# Patient Record
(demographics unavailable — no encounter records)

---

## 2024-10-20 NOTE — REASON FOR VISIT
[Patient preference] : as per patient preference [Telehealth (audio & video) - Individual/Group] : This visit was provided via telehealth using real-time 2-way audio visual technology. [Verbal consent obtained from patient/other participant(s)] : Verbal consent for telehealth/telephonic services obtained from patient/other participant(s) [Patient] : patient, [unfilled] is a ~age~ year old ~male/female~ being seen for a follow-up visit  [Duration of Psychotherapy Visit (minutes spent in synchronous communication): ____] : Duration of Psychotherapy Visit (minutes spent in synchronous communication): [unfilled] [Individual Psychotherapy for 53+ minutes] : Individual Psychotherapy for 53+ minutes  [Teletherapy Service Provided] : The services provided in this session were delivered via tele-therapy [Home] : at home, [unfilled] , at the time of the visit. [Other Location: e.g. Home (Enter Location, City,State)___] : at [unfilled] [Verbal consent obtained from patient] : the patient, [unfilled] [FreeTextEntry4] : 3:05pm [FreeTextEntry5] : 4pm [FreeTextEntry1] : chronics stress and anxiety, depression, OCD symptoms, social isolation, physical pain and mobility issues

## 2024-10-20 NOTE — PLAN
[Cognitive and/or Behavior Therapy] : Cognitive and/or Behavior Therapy  [Skills training (all types)] : Skills training (all types)  [Supportive Therapy] : Supportive Therapy [FreeTextEntry2] : Problem: anxiety symptoms including difficulty relaxing and excessive worry, obsessive thinking and compulsions; depressive symptoms including low mood, irritability, increased appetite, and problems with self-esteem. Goals: Utilization of CBT to address pt's depressive and anxiety/OCD symptoms, including self-monitoring and exposure and response prevention strategies. Pt will become more mindful of her symptoms and distress, and better able to tolerate distress without engaging in avoidance or compulsive behavior. Pt will work on reducing vulnerability factors, including by improving self-care. Pt will learn and employ strategies for relaxation. Pt's score for depression measured with the PHQ9 will be maintained in the minimal range. Pt's GAD7 score will reduce by 20%.  [de-identified] : Kellen was on time for an individual teletherapy session (video and audio). Emergency contact is her son, Sheba. Session focused on processing financial stressors and managing overstimulation in her job. Kellen was receptive to these interventions. [FreeTextEntry1] : Weekly or as-needed individual teletherapy for CBT-based treatment.

## 2024-10-20 NOTE — PHYSICAL EXAM
[WNL] : within normal limits [Anxious] : anxious [Full] : full [de-identified] : congruent with mood

## 2024-11-03 NOTE — PLAN
[Cognitive and/or Behavior Therapy] : Cognitive and/or Behavior Therapy  [Skills training (all types)] : Skills training (all types)  [Supportive Therapy] : Supportive Therapy [FreeTextEntry2] : Problem: anxiety symptoms including difficulty relaxing and excessive worry, obsessive thinking and compulsions; depressive symptoms including low mood, irritability, increased appetite, and problems with self-esteem. Goals: Utilization of CBT to address pt's depressive and anxiety/OCD symptoms, including self-monitoring and exposure and response prevention strategies. Pt will become more mindful of her symptoms and distress, and better able to tolerate distress without engaging in avoidance or compulsive behavior. Pt will work on reducing vulnerability factors, including by improving self-care. Pt will learn and employ strategies for relaxation. Pt's score for depression measured with the PHQ9 will be maintained in the minimal range. Pt's GAD7 score will reduce by 20%.  [de-identified] : Kellen was on time for an individual teletherapy session (video and audio). Emergency contact is her son, Sheba. Session focused on processing thoughts/feelings and specific fears about her age that have been difficult to cope with. Writer provided support and helped with cognitive restructuring. Kellen was receptive to these interventions. [FreeTextEntry1] : Weekly or as-needed individual teletherapy for CBT-based treatment.

## 2024-11-03 NOTE — PHYSICAL EXAM
[WNL] : within normal limits [Anxious] : anxious [Full] : full [de-identified] : congruent with mood

## 2024-11-03 NOTE — REASON FOR VISIT
[Patient preference] : as per patient preference [Telehealth (audio & video) - Individual/Group] : This visit was provided via telehealth using real-time 2-way audio visual technology. [Verbal consent obtained from patient/other participant(s)] : Verbal consent for telehealth/telephonic services obtained from patient/other participant(s) [Patient] : patient, [unfilled] is a ~age~ year old ~male/female~ being seen for a follow-up visit  [Duration of Psychotherapy Visit (minutes spent in synchronous communication): ____] : Duration of Psychotherapy Visit (minutes spent in synchronous communication): [unfilled] [Individual Psychotherapy for 53+ minutes] : Individual Psychotherapy for 53+ minutes  [Teletherapy Service Provided] : The services provided in this session were delivered via tele-therapy [Home] : at home, [unfilled] , at the time of the visit. [Other Location: e.g. Home (Enter Location, City,State)___] : at [unfilled] [Verbal consent obtained from patient] : the patient, [unfilled] [FreeTextEntry5] : 4pm [FreeTextEntry4] : 3:05pm [FreeTextEntry1] : chronics stress and anxiety, depression, OCD symptoms, social isolation, physical pain and mobility issues

## 2024-11-11 NOTE — PHYSICAL EXAM
[WNL] : within normal limits [Anxious] : anxious [Full] : full [de-identified] : congruent with mood

## 2024-11-11 NOTE — PLAN
[Cognitive and/or Behavior Therapy] : Cognitive and/or Behavior Therapy  [Skills training (all types)] : Skills training (all types)  [Supportive Therapy] : Supportive Therapy [FreeTextEntry2] : Problem: anxiety symptoms including difficulty relaxing and excessive worry, obsessive thinking and compulsions; depressive symptoms including low mood, irritability, increased appetite, and problems with self-esteem. Goals: Utilization of CBT to address pt's depressive and anxiety/OCD symptoms, including self-monitoring and exposure and response prevention strategies. Pt will become more mindful of her symptoms and distress, and better able to tolerate distress without engaging in avoidance or compulsive behavior. Pt will work on reducing vulnerability factors, including by improving self-care. Pt will learn and employ strategies for relaxation. Pt's score for depression measured with the PHQ9 will be maintained in the minimal range. Pt's GAD7 score will reduce by 20%.  [de-identified] : Kellen was on time for an individual teletherapy session (video and audio). Emergency contact is her son, Sheba. Session focused on processing plans for the holiday including work, as Kellen finds it difficult to turn down work in her current financial situation, and continued processing of stressful dynamics at home. Kellen was receptive to these interventions. [FreeTextEntry1] : Weekly or as-needed individual teletherapy for CBT-based treatment.

## 2024-11-29 NOTE — PLAN
[Cognitive and/or Behavior Therapy] : Cognitive and/or Behavior Therapy  [Skills training (all types)] : Skills training (all types)  [Supportive Therapy] : Supportive Therapy [FreeTextEntry2] : Problem: anxiety symptoms including difficulty relaxing and excessive worry, obsessive thinking and compulsions; depressive symptoms including low mood, irritability, increased appetite, and problems with self-esteem. Goals: Utilization of CBT to address pt's depressive and anxiety/OCD symptoms, including self-monitoring and exposure and response prevention strategies. Pt will become more mindful of her symptoms and distress, and better able to tolerate distress without engaging in avoidance or compulsive behavior. Pt will work on reducing vulnerability factors, including by improving self-care. Pt will learn and employ strategies for relaxation. Pt's score for depression measured with the PHQ9 will be maintained in the minimal range. Pt's GAD7 score will reduce by 20%.  [de-identified] : Kellen was on time for an individual teletherapy session (video and audio). Emergency contact is her son, Sheba. Session focused on continued support in coping with family stressors and reminders of lost connection and chronic rejection. Kellen was receptive to these interventions. [FreeTextEntry1] : Weekly or as-needed individual teletherapy for CBT-based treatment.

## 2024-11-29 NOTE — PHYSICAL EXAM
[WNL] : within normal limits [Anxious] : anxious [Full] : full [de-identified] : congruent with mood

## 2024-12-09 NOTE — PHYSICAL EXAM
[WNL] : within normal limits [Anxious] : anxious [Full] : full [de-identified] : congruent with mood

## 2024-12-09 NOTE — PLAN
[Cognitive and/or Behavior Therapy] : Cognitive and/or Behavior Therapy  [Skills training (all types)] : Skills training (all types)  [Supportive Therapy] : Supportive Therapy [FreeTextEntry2] : Problem: anxiety symptoms including difficulty relaxing and excessive worry, obsessive thinking and compulsions; depressive symptoms including low mood, irritability, increased appetite, and problems with self-esteem. Goals: Utilization of CBT to address pt's depressive and anxiety/OCD symptoms, including self-monitoring and exposure and response prevention strategies. Pt will become more mindful of her symptoms and distress, and better able to tolerate distress without engaging in avoidance or compulsive behavior. Pt will work on reducing vulnerability factors, including by improving self-care. Pt will learn and employ strategies for relaxation. Pt's score for depression measured with the PHQ9 will be maintained in the minimal range. Pt's GAD7 score will reduce by 20%.  [de-identified] : Kellen was on time for an individual teletherapy session (video and audio). Emergency contact is her son, Sheba. Session focused on continued support regarding Kellen's multiple health concerns and existential fears. Processed news that she has a high risk/chance that macular degeneration will take her eyesight in the next 10 years. Explored preventive measures and Kellen's motivation to stay on top of her care. Kellen was receptive to these interventions. [FreeTextEntry1] : Weekly or as-needed individual teletherapy for CBT-based treatment.

## 2024-12-13 NOTE — PHYSICAL EXAM
[WNL] : within normal limits [Depressed] : depressed [Anxious] : anxious [de-identified] : congruent with mood

## 2024-12-13 NOTE — REASON FOR VISIT
[Patient preference] : as per patient preference [Telehealth (audio & video) - Individual/Group] : This visit was provided via telehealth using real-time 2-way audio visual technology. [Verbal consent obtained from patient/other participant(s)] : Verbal consent for telehealth/telephonic services obtained from patient/other participant(s) [Patient] : patient, [unfilled] is a ~age~ year old ~male/female~ being seen for a follow-up visit  [Duration of Psychotherapy Visit (minutes spent in synchronous communication): ____] : Duration of Psychotherapy Visit (minutes spent in synchronous communication): [unfilled] [Individual Psychotherapy for 53+ minutes] : Individual Psychotherapy for 53+ minutes  [Teletherapy Service Provided] : The services provided in this session were delivered via tele-therapy [Home] : at home, [unfilled] , at the time of the visit. [Other Location: e.g. Home (Enter Location, City,State)___] : at [unfilled] [Verbal consent obtained from patient] : the patient, [unfilled] [FreeTextEntry4] : 4:05pm [FreeTextEntry5] : 5pm [FreeTextEntry1] : chronics stress and anxiety, depression, OCD symptoms, social isolation, physical pain and mobility issues

## 2024-12-13 NOTE — PLAN
[Cognitive and/or Behavior Therapy] : Cognitive and/or Behavior Therapy  [Skills training (all types)] : Skills training (all types)  [Supportive Therapy] : Supportive Therapy [FreeTextEntry2] : Problem: anxiety symptoms including difficulty relaxing and excessive worry, obsessive thinking and compulsions; depressive symptoms including low mood, irritability, increased appetite, and problems with self-esteem. Goals: Utilization of CBT to address pt's depressive and anxiety/OCD symptoms, including self-monitoring and exposure and response prevention strategies. Pt will become more mindful of her symptoms and distress, and better able to tolerate distress without engaging in avoidance or compulsive behavior. Pt will work on reducing vulnerability factors, including by improving self-care. Pt will learn and employ strategies for relaxation. Pt's score for depression measured with the PHQ9 will be maintained in the minimal range. Pt's GAD7 score will reduce by 20%.  [de-identified] : Kellen was on time for an individual teletherapy session (video and audio). Emergency contact is her son, Sheba. Session focused on processing Kellen's family conflicts and coping with her family's undue pressure, misunderstanding of her intentions and guilt-provoking statements regarding Kellne's reluctance to take on a caretaker role for her sister. Writer provided support and validation and encouraged acceptance and detachment (Marlen) as needed to manage stress. Kellen was receptive to these interventions. [FreeTextEntry1] : Weekly or as-needed individual teletherapy for CBT-based treatment.

## 2024-12-26 NOTE — PLAN
[Cognitive and/or Behavior Therapy] : Cognitive and/or Behavior Therapy  [Skills training (all types)] : Skills training (all types)  [Supportive Therapy] : Supportive Therapy [FreeTextEntry2] : Problem: anxiety symptoms including difficulty relaxing and excessive worry, obsessive thinking and compulsions; depressive symptoms including low mood, irritability, increased appetite, and problems with self-esteem. Goals: Utilization of CBT to address pt's depressive and anxiety/OCD symptoms, including self-monitoring and exposure and response prevention strategies. Pt will become more mindful of her symptoms and distress, and better able to tolerate distress without engaging in avoidance or compulsive behavior. Pt will work on reducing vulnerability factors, including by improving self-care. Pt will learn and employ strategies for relaxation. Pt's score for depression measured with the PHQ9 will be maintained in the minimal range. Pt's GAD7 score will reduce by 20%.  [de-identified] : Kellen was on time for an individual teletherapy session (video and audio). Emergency contact is her son, Sheba. Session focused on continued processing of family conflict over Martha's sister's housing and chronic physical and mental health problems. Writer attempted to help Kellen with managing her chronic ruminations and challenging unhelpful attempts to defend herself, as it only leads to increased frustration and stress when her family refuses to acknowledge her points, her needs or her efforts over the years. Kellen was receptive to these interventions. [FreeTextEntry1] : Weekly or as-needed individual teletherapy for CBT-based treatment.

## 2024-12-26 NOTE — PHYSICAL EXAM
[WNL] : within normal limits [Depressed] : depressed [Anxious] : anxious [de-identified] : congruent with mood

## 2024-12-26 NOTE — END OF VISIT
[Individual Psychotherapy for 53+ minutes] : Individual Psychotherapy for 53+ minutes  Current some day smoker

## 2025-01-01 NOTE — PLAN
[Cognitive and/or Behavior Therapy] : Cognitive and/or Behavior Therapy  [Skills training (all types)] : Skills training (all types)  [Supportive Therapy] : Supportive Therapy [FreeTextEntry2] : Problem: anxiety symptoms including difficulty relaxing and excessive worry, obsessive thinking and compulsions; depressive symptoms including low mood, irritability, increased appetite, and problems with self-esteem. Goals: Utilization of CBT to address pt's depressive and anxiety/OCD symptoms, including self-monitoring and exposure and response prevention strategies. Pt will become more mindful of her symptoms and distress, and better able to tolerate distress without engaging in avoidance or compulsive behavior. Pt will work on reducing vulnerability factors, including by improving self-care. Pt will learn and employ strategies for relaxation. Pt's score for depression measured with the PHQ9 will be maintained in the minimal range. Pt's GAD7 score will reduce by 20%.  [de-identified] : Kellen was on time for an individual teletherapy session (video and audio). Emergency contact is her son, Sheba. Session focused on continued support in challenging ruminations about family and prioritizing self-care. Kellen was receptive to these interventions. [FreeTextEntry1] : Weekly or as-needed individual teletherapy for CBT-based treatment.

## 2025-01-05 NOTE — PLAN
[Cognitive and/or Behavior Therapy] : Cognitive and/or Behavior Therapy  [Skills training (all types)] : Skills training (all types)  [Supportive Therapy] : Supportive Therapy [FreeTextEntry2] : Problem: anxiety symptoms including difficulty relaxing and excessive worry, obsessive thinking and compulsions; depressive symptoms including low mood, irritability, increased appetite, and problems with self-esteem. Goals: Utilization of CBT to address pt's depressive and anxiety/OCD symptoms, including self-monitoring and exposure and response prevention strategies. Pt will become more mindful of her symptoms and distress, and better able to tolerate distress without engaging in avoidance or compulsive behavior. Pt will work on reducing vulnerability factors, including by improving self-care. Pt will learn and employ strategies for relaxation. Pt's score for depression measured with the PHQ9 will be maintained in the minimal range. Pt's GAD7 score will reduce by 20%.  [de-identified] : Kellen was on time for an individual teletherapy session (video and audio). Emergency contact is her son, Sheba. Session focused on continued support to valencia rumination, as Kellen notices she continues to have internal dialogue as if arguing with her family, while in reality family is not receptive to understanding Kellen better or treating her better. Kellen was receptive to these interventions. [FreeTextEntry1] : Weekly or as-needed individual teletherapy for CBT-based treatment.

## 2025-01-13 NOTE — PLAN
[Cognitive and/or Behavior Therapy] : Cognitive and/or Behavior Therapy  [Skills training (all types)] : Skills training (all types)  [Supportive Therapy] : Supportive Therapy [FreeTextEntry2] : Problem: anxiety symptoms including difficulty relaxing and excessive worry, obsessive thinking and compulsions; depressive symptoms including low mood, irritability, increased appetite, and problems with self-esteem. Goals: Utilization of CBT to address pt's depressive and anxiety/OCD symptoms, including self-monitoring and exposure and response prevention strategies. Pt will become more mindful of her symptoms and distress, and better able to tolerate distress without engaging in avoidance or compulsive behavior. Pt will work on reducing vulnerability factors, including by improving self-care. Pt will learn and employ strategies for relaxation. Pt's score for depression measured with the PHQ9 will be maintained in the minimal range. Pt's GAD7 score will reduce by 20%.  [de-identified] : Kellen was on time for an individual teletherapy session (video and audio). Emergency contact is her son, Sheba. Session focused on highlighting aspects of Kellen's health and family relationships that cannot be changed and will require some acceptance work versus problem-solving. Validated Kellen's fears due to her health problems while highlighting the toll of chronic worry on her health and enjoyment of life. Kellen was receptive to these interventions. [FreeTextEntry1] : Weekly or as-needed individual teletherapy for CBT-based treatment.

## 2025-01-21 NOTE — PLAN
[Cognitive and/or Behavior Therapy] : Cognitive and/or Behavior Therapy  [Skills training (all types)] : Skills training (all types)  [Supportive Therapy] : Supportive Therapy [FreeTextEntry2] : Problem: anxiety symptoms including difficulty relaxing and excessive worry, obsessive thinking and compulsions; depressive symptoms including low mood, irritability, increased appetite, and problems with self-esteem. Goals: Utilization of CBT to address pt's depressive and anxiety/OCD symptoms, including self-monitoring and exposure and response prevention strategies. Pt will become more mindful of her symptoms and distress, and better able to tolerate distress without engaging in avoidance or compulsive behavior. Pt will work on reducing vulnerability factors, including by improving self-care. Pt will learn and employ strategies for relaxation. Pt's score for depression measured with the PHQ9 will be maintained in the minimal range. Pt's GAD7 score will reduce by 20%.  [de-identified] : Kellen was on time for an individual teletherapy session (video and audio). Emergency contact is her son, Sheba. Session focused on supporting Kellen related to health concerns as well as her decision to reconnect with a friend who has been a source of both connection and stress in the past. Discussed pros and cons of re-engaging with individuals who may evoke stress and trigger feelings of rejection but who are familiar to her and help alleviate loneliness/isolation. Kellen was receptive to these interventions. [FreeTextEntry1] : Weekly or as-needed individual teletherapy for CBT-based treatment.

## 2025-01-28 NOTE — PLAN
[Cognitive and/or Behavior Therapy] : Cognitive and/or Behavior Therapy  [Skills training (all types)] : Skills training (all types)  [Supportive Therapy] : Supportive Therapy [FreeTextEntry2] : Problem: anxiety symptoms including difficulty relaxing and excessive worry, obsessive thinking and compulsions; depressive symptoms including low mood, irritability, increased appetite, and problems with self-esteem. Goals: Utilization of CBT to address pt's depressive and anxiety/OCD symptoms, including self-monitoring and exposure and response prevention strategies. Pt will become more mindful of her symptoms and distress, and better able to tolerate distress without engaging in avoidance or compulsive behavior. Pt will work on reducing vulnerability factors, including by improving self-care. Pt will learn and employ strategies for relaxation. Pt's score for depression measured with the PHQ9 will be maintained in the minimal range. Pt's GAD7 score will reduce by 20%.  [de-identified] : Kellen was on time for an individual teletherapy session (video and audio). Emergency contact is her son, Sheba. Session focused on supporting Kellen regarding her relationship satisfaction and reviewing coping skills. Kellen was receptive to these interventions. [FreeTextEntry1] : Weekly or as-needed individual teletherapy for CBT-based treatment.

## 2025-01-31 NOTE — PLAN
[Cognitive and/or Behavior Therapy] : Cognitive and/or Behavior Therapy  [Skills training (all types)] : Skills training (all types)  [Supportive Therapy] : Supportive Therapy [FreeTextEntry2] : Problem: anxiety symptoms including difficulty relaxing and excessive worry, obsessive thinking and compulsions; depressive symptoms including low mood, irritability, increased appetite, and problems with self-esteem. Goals: Utilization of CBT to address pt's depressive and anxiety/OCD symptoms, including self-monitoring and exposure and response prevention strategies. Pt will become more mindful of her symptoms and distress, and better able to tolerate distress without engaging in avoidance or compulsive behavior. Pt will work on reducing vulnerability factors, including by improving self-care. Pt will learn and employ strategies for relaxation. Pt's score for depression measured with the PHQ9 will be maintained in the minimal range. Pt's GAD7 score will reduce by 20%.  [Motivational Interviewing] : Motivational Interviewing  [de-identified] : Kellen was on time for an individual teletherapy session (video and audio). Emergency contact is her son, Sheba. Session focused on processing and challenging mortality preoccupation and negative thoughts about the future. Discussed pros and cons of continuing to avoid thinking about socializing versus proactively seeking connections and new purpose in anticipation of her son moving. Processed fears of abandonment and how this plays into avoidance. Kellen was receptive to these interventions. [FreeTextEntry1] : Weekly or as-needed individual teletherapy for CBT-based treatment.

## 2025-02-24 NOTE — PLAN
[Cognitive and/or Behavior Therapy] : Cognitive and/or Behavior Therapy  [Motivational Interviewing] : Motivational Interviewing  [Skills training (all types)] : Skills training (all types)  [Supportive Therapy] : Supportive Therapy [FreeTextEntry2] : Problem: anxiety symptoms including difficulty relaxing and excessive worry, obsessive thinking and compulsions; depressive symptoms including low mood, irritability, increased appetite, and problems with self-esteem. Goals: Utilization of CBT to address pt's depressive and anxiety/OCD symptoms, including self-monitoring and exposure and response prevention strategies. Pt will become more mindful of her symptoms and distress, and better able to tolerate distress without engaging in avoidance or compulsive behavior. Pt will work on reducing vulnerability factors, including by improving self-care. Pt will learn and employ strategies for relaxation. Pt's score for depression measured with the PHQ9 will be maintained in the minimal range. Pt's GAD7 score will reduce by 20%.  [de-identified] : Kellen was on time for an individual teletherapy session (video and audio). Emergency contact is her son, Sheba. Session focused on processing Kellen's work on more realistic expectations and acceptance of her best friend, whom she recent chose to re-engage after a falling out. Discussed skills to better manage frustrations regarding her best friend's personality traits. Kellen was receptive to these interventions. [FreeTextEntry1] : Weekly or as-needed individual teletherapy for CBT-based treatment.

## 2025-03-10 NOTE — PLAN
[Cognitive and/or Behavior Therapy] : Cognitive and/or Behavior Therapy  [Motivational Interviewing] : Motivational Interviewing  [Skills training (all types)] : Skills training (all types)  [Supportive Therapy] : Supportive Therapy [FreeTextEntry2] : Problem: anxiety symptoms including difficulty relaxing and excessive worry, obsessive thinking and compulsions; depressive symptoms including low mood, irritability, increased appetite, and problems with self-esteem. Goals: Utilization of CBT to address pt's depressive and anxiety/OCD symptoms, including self-monitoring and exposure and response prevention strategies. Pt will become more mindful of her symptoms and distress, and better able to tolerate distress without engaging in avoidance or compulsive behavior. Pt will work on reducing vulnerability factors, including by improving self-care. Pt will learn and employ strategies for relaxation. Pt's score for depression measured with the PHQ9 will be maintained in the minimal range. Pt's GAD7 score will reduce by 20%.  [de-identified] : Kellen was on time for an individual teletherapy session (video and audio). Emergency contact is her son, Sheba. Session focused on processing financial concerns as Kellen prepares for her son to move out in the near future, which will mean the end of his household contributions. Discussed the benefits of Kellen's hobby of coloring and reinforced her involvement in an online group with peers who also enjoy the hobby and are encouraging of each other without judgment. Kellen was receptive to these interventions. [FreeTextEntry1] : Weekly or as-needed individual teletherapy for CBT-based treatment.

## 2025-03-16 NOTE — PLAN
[Cognitive and/or Behavior Therapy] : Cognitive and/or Behavior Therapy  [Motivational Interviewing] : Motivational Interviewing  [Skills training (all types)] : Skills training (all types)  [Supportive Therapy] : Supportive Therapy [FreeTextEntry2] : Problem: anxiety symptoms including difficulty relaxing and excessive worry, obsessive thinking and compulsions; depressive symptoms including low mood, irritability, increased appetite, and problems with self-esteem. Goals: Utilization of CBT to address pt's depressive and anxiety/OCD symptoms, including self-monitoring and exposure and response prevention strategies. Pt will become more mindful of her symptoms and distress, and better able to tolerate distress without engaging in avoidance or compulsive behavior. Pt will work on reducing vulnerability factors, including by improving self-care. Pt will learn and employ strategies for relaxation. Pt's score for depression measured with the PHQ9 will be maintained in the minimal range. Pt's GAD7 score will reduce by 20%.  [de-identified] : Kellen was on time for an individual teletherapy session (video and audio). Emergency contact is her son, Sheba. Session focused on processing anticipatory grief related to new information suggesting son may find his own home and move out soon. Challenged negative, distorted thoughts about son "betraying" her with his life choices. Kellen was receptive to these interventions. [FreeTextEntry1] : Weekly or as-needed individual teletherapy for CBT-based treatment.

## 2025-03-23 NOTE — PLAN
[Cognitive and/or Behavior Therapy] : Cognitive and/or Behavior Therapy  [Motivational Interviewing] : Motivational Interviewing  [Skills training (all types)] : Skills training (all types)  [Supportive Therapy] : Supportive Therapy [FreeTextEntry2] : Problem: anxiety symptoms including difficulty relaxing and excessive worry, obsessive thinking and compulsions; depressive symptoms including low mood, irritability, increased appetite, and problems with self-esteem. Goals: Utilization of CBT to address pt's depressive and anxiety/OCD symptoms, including self-monitoring and exposure and response prevention strategies. Pt will become more mindful of her symptoms and distress, and better able to tolerate distress without engaging in avoidance or compulsive behavior. Pt will work on reducing vulnerability factors, including by improving self-care. Pt will learn and employ strategies for relaxation. Pt's score for depression measured with the PHQ9 will be maintained in the minimal range. Pt's GAD7 score will reduce by 20%.  [de-identified] : Kellen was on time for an individual teletherapy session (video and audio). Emergency contact is her son, Sheba. Session focused on processing relational challenges with her friend. Writer used CBT interventions, and Kellen was receptive. [FreeTextEntry1] : Weekly or as-needed individual teletherapy for CBT-based treatment.

## 2025-03-30 NOTE — PLAN
[Cognitive and/or Behavior Therapy] : Cognitive and/or Behavior Therapy  [Motivational Interviewing] : Motivational Interviewing  [Skills training (all types)] : Skills training (all types)  [Supportive Therapy] : Supportive Therapy [FreeTextEntry2] : Problem: anxiety symptoms including difficulty relaxing and excessive worry, obsessive thinking and compulsions; depressive symptoms including low mood, irritability, increased appetite, and problems with self-esteem. Goals: Utilization of CBT to address pt's depressive and anxiety/OCD symptoms, including self-monitoring and exposure and response prevention strategies. Pt will become more mindful of her symptoms and distress, and better able to tolerate distress without engaging in avoidance or compulsive behavior. Pt will work on reducing vulnerability factors, including by improving self-care. Pt will learn and employ strategies for relaxation. Pt's score for depression measured with the PHQ9 will be maintained in the minimal range. Pt's GAD7 score will reduce by 20%.  [de-identified] : Kellen was on time for an individual teletherapy session (video and audio). Emergency contact is her son, Sheba. Session focused on continued emotional support and help with cognitive restructuring around her fears, intrusive and ruminative thoughts about her son moving in the near future and "being alone." Noted Kellen is processing son's plans to move similar to an impending death, and this is activating her trauma and existential fears. Kellen was receptive to these interventions.  [FreeTextEntry1] : Weekly or as-needed individual teletherapy for CBT-based treatment.

## 2025-04-23 NOTE — PLAN
[Cognitive and/or Behavior Therapy] : Cognitive and/or Behavior Therapy  [Motivational Interviewing] : Motivational Interviewing  [Skills training (all types)] : Skills training (all types)  [Supportive Therapy] : Supportive Therapy [FreeTextEntry2] : Problem: anxiety symptoms including difficulty relaxing and excessive worry, obsessive thinking and compulsions; depressive symptoms including low mood, irritability, increased appetite, and problems with self-esteem. Goals: Utilization of CBT to address pt's depressive and anxiety/OCD symptoms, including self-monitoring and exposure and response prevention strategies. Pt will become more mindful of her symptoms and distress, and better able to tolerate distress without engaging in avoidance or compulsive behavior. Pt will work on reducing vulnerability factors, including by improving self-care. Pt will learn and employ strategies for relaxation. Pt's score for depression measured with the PHQ9 will be maintained in the minimal range. Pt's GAD7 score will reduce by 20%.  [de-identified] : Kellen was on time for an individual teletherapy session (video and audio). Emergency contact is her son, Sheba. Session focused on providing support and cognitive restructuring around Kellen's thoughts about son's decision to move 3 hours away. Kellen was receptive to these interventions.  [FreeTextEntry1] : Weekly or as-needed individual teletherapy for CBT-based treatment.

## 2025-04-23 NOTE — PLAN
[Cognitive and/or Behavior Therapy] : Cognitive and/or Behavior Therapy  [Motivational Interviewing] : Motivational Interviewing  [Skills training (all types)] : Skills training (all types)  [Supportive Therapy] : Supportive Therapy [FreeTextEntry2] : Problem: anxiety symptoms including difficulty relaxing and excessive worry, obsessive thinking and compulsions; depressive symptoms including low mood, irritability, increased appetite, and problems with self-esteem. Goals: Utilization of CBT to address pt's depressive and anxiety/OCD symptoms, including self-monitoring and exposure and response prevention strategies. Pt will become more mindful of her symptoms and distress, and better able to tolerate distress without engaging in avoidance or compulsive behavior. Pt will work on reducing vulnerability factors, including by improving self-care. Pt will learn and employ strategies for relaxation. Pt's score for depression measured with the PHQ9 will be maintained in the minimal range. Pt's GAD7 score will reduce by 20%.  [de-identified] : Kellen was on time for an individual teletherapy session (video and audio). Emergency contact is her son, Sheba. Session focused on providing support and cognitive restructuring around Kellen's thoughts about son's decision to move 3 hours away. Kellen was receptive to these interventions.  [FreeTextEntry1] : Weekly or as-needed individual teletherapy for CBT-based treatment.

## 2025-04-29 NOTE — PLAN
[Cognitive and/or Behavior Therapy] : Cognitive and/or Behavior Therapy  [Motivational Interviewing] : Motivational Interviewing  [Skills training (all types)] : Skills training (all types)  [Supportive Therapy] : Supportive Therapy [FreeTextEntry2] : Problem: anxiety symptoms including difficulty relaxing and excessive worry, obsessive thinking and compulsions; depressive symptoms including low mood, irritability, increased appetite, and problems with self-esteem. Goals: Utilization of CBT to address pt's depressive and anxiety/OCD symptoms, including self-monitoring and exposure and response prevention strategies. Pt will become more mindful of her symptoms and distress, and better able to tolerate distress without engaging in avoidance or compulsive behavior. Pt will work on reducing vulnerability factors, including by improving self-care. Pt will learn and employ strategies for relaxation. Pt's score for depression measured with the PHQ9 will be maintained in the minimal range. Pt's GAD7 score will reduce by 20%.  [de-identified] : Kellen was on time for an individual teletherapy session (video and audio). Emergency contact is her son, Sheba. Session focused on continued support and cognitive restructuring related to her son's impending move several hours away. Writer noted Kellen's improved ability to talk about and visualize this change, rather than avoiding or protesting it. Kellen was receptive to these interventions.  [FreeTextEntry1] : Weekly or as-needed individual teletherapy for CBT-based treatment.

## 2025-05-12 NOTE — PLAN
[Cognitive and/or Behavior Therapy] : Cognitive and/or Behavior Therapy  [Motivational Interviewing] : Motivational Interviewing  [Skills training (all types)] : Skills training (all types)  [Supportive Therapy] : Supportive Therapy [FreeTextEntry2] : Problem: anxiety symptoms including difficulty relaxing and excessive worry, obsessive thinking and compulsions; depressive symptoms including low mood, irritability, increased appetite, and problems with self-esteem. Goals: Utilization of CBT to address pt's depressive and anxiety/OCD symptoms, including self-monitoring and exposure and response prevention strategies. Pt will become more mindful of her symptoms and distress, and better able to tolerate distress without engaging in avoidance or compulsive behavior. Pt will work on reducing vulnerability factors, including by improving self-care. Pt will learn and employ strategies for relaxation. Pt's score for depression measured with the PHQ9 will be maintained in the minimal range. Pt's GAD7 score will reduce by 20%.  [de-identified] : Kellen was on time for an individual teletherapy session (video and audio). Emergency contact is her son, Sheba. Session focused on reviewing health stressors and reinforcing general coping skills as well as emotional exposure as needed to challenge unhealthy avoidance. Kellen was receptive to these interventions.  [FreeTextEntry1] : Weekly or as-needed individual teletherapy for CBT-based treatment.

## 2025-05-20 NOTE — PLAN
[Cognitive and/or Behavior Therapy] : Cognitive and/or Behavior Therapy  [Motivational Interviewing] : Motivational Interviewing  [Skills training (all types)] : Skills training (all types)  [Supportive Therapy] : Supportive Therapy [FreeTextEntry2] : Problem: anxiety symptoms including difficulty relaxing and excessive worry, obsessive thinking and compulsions; depressive symptoms including low mood, irritability, increased appetite, and problems with self-esteem. Goals: Utilization of CBT to address pt's depressive and anxiety/OCD symptoms, including self-monitoring and exposure and response prevention strategies. Pt will become more mindful of her symptoms and distress, and better able to tolerate distress without engaging in avoidance or compulsive behavior. Pt will work on reducing vulnerability factors, including by improving self-care. Pt will learn and employ strategies for relaxation. Pt's score for depression measured with the PHQ9 will be maintained in the minimal range. Pt's GAD7 score will reduce by 20%.  [de-identified] : Kellen was on time for an individual teletherapy session (video and audio). Emergency contact is her son, Sheba. Session focused on depression management as Kellen continues to report tearfulness almost every day and difficulty managing negative thoughts that are interfering with her quality of life. Reviewed evidence for and against her own resilience capability and noted her tendency to underestimate herself as well as avoid thinking about change. Kellen was receptive to these interventions.  [FreeTextEntry1] : Weekly or as-needed individual teletherapy for CBT-based treatment.

## 2025-05-27 NOTE — PLAN
[Cognitive and/or Behavior Therapy] : Cognitive and/or Behavior Therapy  [Motivational Interviewing] : Motivational Interviewing  [Skills training (all types)] : Skills training (all types)  [Supportive Therapy] : Supportive Therapy [FreeTextEntry2] : Problem: anxiety symptoms including difficulty relaxing and excessive worry, obsessive thinking and compulsions; depressive symptoms including low mood, irritability, increased appetite, and problems with self-esteem. Goals: Utilization of CBT to address pt's depressive and anxiety/OCD symptoms, including self-monitoring and exposure and response prevention strategies. Pt will become more mindful of her symptoms and distress, and better able to tolerate distress without engaging in avoidance or compulsive behavior. Pt will work on reducing vulnerability factors, including by improving self-care. Pt will learn and employ strategies for relaxation. Pt's score for depression measured with the PHQ9 will be maintained in the minimal range. Pt's GAD7 score will reduce by 20%.  [de-identified] : Kellen was on time for an individual teletherapy session (video and audio). Emergency contact is her son, Sheba. Session focused on stressors of her dog's health issues as well as her sister's recent outreach to Kellen. Discussed why contact by sister would consistently provoke her anxiety and helped prepare Kellen for how she would respond if sister asked to move in with her. Kellen was receptive to these interventions.  [FreeTextEntry1] : Weekly or as-needed individual teletherapy for CBT-based treatment.

## 2025-06-02 NOTE — PLAN
[Cognitive and/or Behavior Therapy] : Cognitive and/or Behavior Therapy  [Motivational Interviewing] : Motivational Interviewing  [Skills training (all types)] : Skills training (all types)  [Supportive Therapy] : Supportive Therapy [FreeTextEntry2] : Problem: anxiety symptoms including difficulty relaxing and excessive worry, obsessive thinking and compulsions; depressive symptoms including low mood, irritability, increased appetite, and problems with self-esteem. Goals: Utilization of CBT to address pt's depressive and anxiety/OCD symptoms, including self-monitoring and exposure and response prevention strategies. Pt will become more mindful of her symptoms and distress, and better able to tolerate distress without engaging in avoidance or compulsive behavior. Pt will work on reducing vulnerability factors, including by improving self-care. Pt will learn and employ strategies for relaxation. Pt's score for depression measured with the PHQ9 will be maintained in the minimal range. Pt's GAD7 score will reduce by 20%.  [de-identified] : Kellen was on time for an individual teletherapy session (video and audio). Emergency contact is her son, Sheba. Session focused on exploring Kellen's dynamics with son, which cause chronic stress, and providing psychoeducation and encouragement around boundaries and avoiding reinforcement or enabling of abusive or hurtful behavior. Kellen was receptive to these interventions.  [FreeTextEntry1] : Weekly or as-needed individual teletherapy for CBT-based treatment.

## 2025-06-23 NOTE — PLAN
[Cognitive and/or Behavior Therapy] : Cognitive and/or Behavior Therapy  [Motivational Interviewing] : Motivational Interviewing  [Skills training (all types)] : Skills training (all types)  [Supportive Therapy] : Supportive Therapy [FreeTextEntry2] : Problem: anxiety symptoms including difficulty relaxing and excessive worry, obsessive thinking and compulsions; depressive symptoms including low mood, irritability, increased appetite, and problems with self-esteem. Goals: Utilization of CBT to address pt's depressive and anxiety/OCD symptoms, including self-monitoring and exposure and response prevention strategies. Pt will become more mindful of her symptoms and distress, and better able to tolerate distress without engaging in avoidance or compulsive behavior. Pt will work on reducing vulnerability factors, including by improving self-care. Pt will learn and employ strategies for relaxation. Pt's score for depression measured with the PHQ9 will be maintained in the minimal range. Pt's GAD7 score will reduce by 20%.  [de-identified] : Kellen was on time for an individual teletherapy session (video and audio). Emergency contact is her son, Sheba. Session focused on continued support for stress of life transitions (anticipated empty nest), especially due to financial and emotional dependence on son. Writer helped with insight development and reinforcement of resilience principles. Kellen was receptive to these interventions.  [FreeTextEntry1] : Weekly or as-needed individual teletherapy for CBT-based treatment.

## 2025-07-07 NOTE — PLAN
[Cognitive and/or Behavior Therapy] : Cognitive and/or Behavior Therapy  [Motivational Interviewing] : Motivational Interviewing  [Skills training (all types)] : Skills training (all types)  [Supportive Therapy] : Supportive Therapy [FreeTextEntry2] : Problem: anxiety symptoms including difficulty relaxing and excessive worry, obsessive thinking and compulsions; depressive symptoms including low mood, irritability, increased appetite, and problems with self-esteem. Goals: Utilization of CBT to address pt's depressive and anxiety/OCD symptoms, including self-monitoring and exposure and response prevention strategies. Pt will become more mindful of her symptoms and distress, and better able to tolerate distress without engaging in avoidance or compulsive behavior. Pt will work on reducing vulnerability factors, including by improving self-care. Pt will learn and employ strategies for relaxation. Pt's score for depression measured with the PHQ9 will be maintained in the minimal range. Pt's GAD7 score will reduce by 20%.  [de-identified] : Kellen was on time for an individual teletherapy session (video and audio). Emergency contact is her son, Sheba. Session focused on processing of past family trauma and present-day reminders. Kellen was receptive to these interventions.  [FreeTextEntry1] : Weekly or as-needed individual teletherapy for CBT-based treatment.

## 2025-07-21 NOTE — PLAN
[FreeTextEntry2] : Problem: anxiety symptoms including difficulty relaxing and excessive worry, obsessive thinking and compulsions; depressive symptoms including low mood, irritability, increased appetite, and problems with self-esteem. Goals: Utilization of CBT to address pt's depressive and anxiety/OCD symptoms, including self-monitoring and exposure and response prevention strategies. Pt will become more mindful of her symptoms and distress, and better able to tolerate distress without engaging in avoidance or compulsive behavior. Pt will work on reducing vulnerability factors, including by improving self-care. Pt will learn and employ strategies for relaxation. Pt's score for depression measured with the PHQ9 will be maintained in the minimal range. Pt's GAD7 score will reduce by 20%.  [de-identified] : Kellen was on time for an individual teletherapy session (video and audio). Emergency contact is her son, Sheba. Session focused on exploring emotional triggers in relationships and how to better self-regulate when she experiences the urge to defend herself from the same, continuous character attacks. Kellen was receptive to these interventions.  [FreeTextEntry1] : Weekly or as-needed individual teletherapy for CBT-based treatment.

## 2025-07-30 NOTE — PLAN
[Cognitive and/or Behavior Therapy] : Cognitive and/or Behavior Therapy  [Motivational Interviewing] : Motivational Interviewing  [Skills training (all types)] : Skills training (all types)  [Supportive Therapy] : Supportive Therapy [FreeTextEntry2] : Problem: anxiety symptoms including difficulty relaxing and excessive worry, obsessive thinking and compulsions; depressive symptoms including low mood, irritability, increased appetite, and problems with self-esteem. Goals: Utilization of CBT to address pt's depressive and anxiety/OCD symptoms, including self-monitoring and exposure and response prevention strategies. Pt will become more mindful of her symptoms and distress, and better able to tolerate distress without engaging in avoidance or compulsive behavior. Pt will work on reducing vulnerability factors, including by improving self-care. Pt will learn and employ strategies for relaxation. Pt's score for depression measured with the PHQ9 will be maintained in the minimal range. Pt's GAD7 score will reduce by 20%.  [de-identified] : Kellen was on time for an individual teletherapy session (video and audio). Emergency contact is her son, Sheba. Session focused on coping with negative treatment from siblings and other family members. Discussed pros and cons of getting stuck on unfairness versus accepting others as they are and designing boundaries and expectations accordingly. Kellen was receptive to these interventions.  [FreeTextEntry1] : Weekly or as-needed individual teletherapy for CBT-based treatment.

## 2025-07-30 NOTE — PLAN
[Cognitive and/or Behavior Therapy] : Cognitive and/or Behavior Therapy  [Motivational Interviewing] : Motivational Interviewing  [Skills training (all types)] : Skills training (all types)  [Supportive Therapy] : Supportive Therapy [FreeTextEntry2] : Problem: anxiety symptoms including difficulty relaxing and excessive worry, obsessive thinking and compulsions; depressive symptoms including low mood, irritability, increased appetite, and problems with self-esteem. Goals: Utilization of CBT to address pt's depressive and anxiety/OCD symptoms, including self-monitoring and exposure and response prevention strategies. Pt will become more mindful of her symptoms and distress, and better able to tolerate distress without engaging in avoidance or compulsive behavior. Pt will work on reducing vulnerability factors, including by improving self-care. Pt will learn and employ strategies for relaxation. Pt's score for depression measured with the PHQ9 will be maintained in the minimal range. Pt's GAD7 score will reduce by 20%.  [de-identified] : Kellen was on time for an individual teletherapy session (video and audio). Emergency contact is her son, Sheba. Session focused on coping with negative treatment from siblings and other family members. Discussed pros and cons of getting stuck on unfairness versus accepting others as they are and designing boundaries and expectations accordingly. Kellen was receptive to these interventions.  [FreeTextEntry1] : Weekly or as-needed individual teletherapy for CBT-based treatment.

## 2025-07-30 NOTE — REASON FOR VISIT
[Patient preference] : as per patient preference [Telehealth (audio & video) - Individual/Group] : This visit was provided via telehealth using real-time 2-way audio visual technology. [Verbal consent obtained from patient/other participant(s)] : Verbal consent for telehealth/telephonic services obtained from patient/other participant(s) [Patient] : patient, [unfilled] is a ~age~ year old ~male/female~ being seen for a follow-up visit  [Duration of Psychotherapy Visit (minutes spent in synchronous communication): ____] : Duration of Psychotherapy Visit (minutes spent in synchronous communication): [unfilled] [Individual Psychotherapy for 53+ minutes] : Individual Psychotherapy for 53+ minutes  [Teletherapy Service Provided] : The services provided in this session were delivered via tele-therapy [Home] : at home, [unfilled] , at the time of the visit. [Other Location: e.g. Home (Enter Location, City,State)___] : at [unfilled] [Verbal consent obtained from patient] : the patient, [unfilled] [FreeTextEntry4] : 3:07pm [FreeTextEntry5] : 4pm [FreeTextEntry1] : chronics stress and anxiety, depression, OCD symptoms, social isolation, physical pain and mobility issues